# Patient Record
Sex: FEMALE | Race: BLACK OR AFRICAN AMERICAN | NOT HISPANIC OR LATINO | Employment: FULL TIME | URBAN - METROPOLITAN AREA
[De-identification: names, ages, dates, MRNs, and addresses within clinical notes are randomized per-mention and may not be internally consistent; named-entity substitution may affect disease eponyms.]

---

## 2021-11-27 ENCOUNTER — HOSPITAL ENCOUNTER (EMERGENCY)
Facility: HOSPITAL | Age: 21
Discharge: HOME | End: 2021-11-27
Attending: EMERGENCY MEDICINE

## 2021-11-27 VITALS
DIASTOLIC BLOOD PRESSURE: 70 MMHG | OXYGEN SATURATION: 100 % | SYSTOLIC BLOOD PRESSURE: 126 MMHG | BODY MASS INDEX: 25.76 KG/M2 | HEART RATE: 65 BPM | WEIGHT: 170 LBS | RESPIRATION RATE: 15 BRPM | TEMPERATURE: 98 F | HEIGHT: 68 IN

## 2021-11-27 DIAGNOSIS — T07.XXXA MULTIPLE ABRASIONS: ICD-10-CM

## 2021-11-27 DIAGNOSIS — M54.2 NECK PAIN: ICD-10-CM
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
## 2021-11-27 DIAGNOSIS — M79.10 MYALGIA: ICD-10-CM

## 2021-11-27 LAB
B-HCG UR QL: NEGATIVE
CTP QC/QA: YES

## 2021-11-27 PROCEDURE — 96372 THER/PROPH/DIAG INJ SC/IM: CPT | Mod: 59

## 2021-11-27 PROCEDURE — 81025 URINE PREGNANCY TEST: CPT | Performed by: NURSE PRACTITIONER

## 2021-11-27 PROCEDURE — 99284 EMERGENCY DEPT VISIT MOD MDM: CPT

## 2021-11-27 PROCEDURE — 63600175 PHARM REV CODE 636 W HCPCS: Performed by: NURSE PRACTITIONER

## 2021-11-27 RX ORDER — KETOROLAC TROMETHAMINE 30 MG/ML
30 INJECTION, SOLUTION INTRAMUSCULAR; INTRAVENOUS
Status: COMPLETED | OUTPATIENT
Start: 2021-11-27 | End: 2021-11-27

## 2021-11-27 RX ORDER — ORPHENADRINE CITRATE 30 MG/ML
60 INJECTION INTRAMUSCULAR; INTRAVENOUS
Status: COMPLETED | OUTPATIENT
Start: 2021-11-27 | End: 2021-11-27

## 2021-11-27 RX ORDER — LIDOCAINE 50 MG/G
1 PATCH TOPICAL DAILY
Qty: 15 PATCH | Refills: 0 | Status: SHIPPED | OUTPATIENT
Start: 2021-11-27

## 2021-11-27 RX ORDER — IBUPROFEN 600 MG/1
600 TABLET ORAL EVERY 6 HOURS PRN
Qty: 20 TABLET | Refills: 0 | Status: SHIPPED | OUTPATIENT
Start: 2021-11-27

## 2021-11-27 RX ORDER — METHOCARBAMOL 500 MG/1
1000 TABLET, FILM COATED ORAL 3 TIMES DAILY
Qty: 30 TABLET | Refills: 0 | Status: SHIPPED | OUTPATIENT
Start: 2021-11-27 | End: 2021-12-02

## 2021-11-27 RX ADMIN — KETOROLAC TROMETHAMINE 30 MG: 30 INJECTION, SOLUTION INTRAMUSCULAR at 06:11

## 2021-11-27 RX ADMIN — ORPHENADRINE CITRATE 60 MG: 60 INJECTION INTRAMUSCULAR; INTRAVENOUS at 06:11

## 2022-11-18 ENCOUNTER — HOSPITAL ENCOUNTER (EMERGENCY)
Facility: HOSPITAL | Age: 22
Discharge: HOME | End: 2022-11-19
Attending: STUDENT IN AN ORGANIZED HEALTH CARE EDUCATION/TRAINING PROGRAM
Payer: MEDICAID

## 2022-11-18 DIAGNOSIS — R52 PAIN: ICD-10-CM

## 2022-11-18 DIAGNOSIS — T23.172A: ICD-10-CM

## 2022-11-18 DIAGNOSIS — M25.532 LEFT WRIST PAIN: ICD-10-CM

## 2022-11-18 DIAGNOSIS — T23.102A: ICD-10-CM
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
## 2022-11-18 PROCEDURE — 99284 EMERGENCY DEPT VISIT MOD MDM: CPT

## 2022-11-18 PROCEDURE — 81025 URINE PREGNANCY TEST: CPT | Performed by: NURSE PRACTITIONER

## 2022-11-19 VITALS
WEIGHT: 165 LBS | RESPIRATION RATE: 18 BRPM | OXYGEN SATURATION: 100 % | BODY MASS INDEX: 25.01 KG/M2 | HEART RATE: 65 BPM | TEMPERATURE: 98 F | SYSTOLIC BLOOD PRESSURE: 123 MMHG | DIASTOLIC BLOOD PRESSURE: 77 MMHG | HEIGHT: 68 IN

## 2022-11-19 LAB
B-HCG UR QL: NEGATIVE
CTP QC/QA: YES

## 2022-11-19 PROCEDURE — 25000003 PHARM REV CODE 250: Performed by: STUDENT IN AN ORGANIZED HEALTH CARE EDUCATION/TRAINING PROGRAM

## 2022-11-19 RX ORDER — HYDROCODONE BITARTRATE AND ACETAMINOPHEN 5; 325 MG/1; MG/1
1 TABLET ORAL
Status: COMPLETED | OUTPATIENT
Start: 2022-11-19 | End: 2022-11-19

## 2022-11-19 RX ADMIN — HYDROCODONE BITARTRATE AND ACETAMINOPHEN 1 TABLET: 5; 325 TABLET ORAL at 01:11

## 2022-11-19 NOTE — DISCHARGE INSTRUCTIONS
Take tylenol 1000 mg and ibuprofen 600 mg for pain every 6 hours  Return if symptoms worsen.      Thank you for coming to our Emergency Department today. It is important to remember that some problems or medical conditions are difficult to diagnose and may not be found during your Emergency Department visit.     Be sure to follow up with your primary care doctor and review all labs/imaging/tests that were performed during your ER visit with them. Some labs/tests may be outside of the normal range and require non-emergent follow-up and further investigation to help diagnose/exclude/prevent complications or other potentially serious medical conditions that were not addressed during your ER visit.    If you do not have a primary care doctor, you may contact the one listed on your discharge paperwork or you may also call the [facility] Appointment Desk at [phone] to schedule an appointment and establish care with one. Another resources for finding primary care physicians: [email or web address] It is important to your health that you have a primary care doctor.    Please take all medications as directed. All medications may potentially have side-effects and it is impossible to predict which medications may give you side-effects or what side-effects (if any) they will give you. If you feel that you are having a negative effect or side-effect of any medication you should immediately stop taking them and seek medical attention. If you feel that you are having a life-threatening reaction call 911.    Return to the ER with any questions/concerns, new/concerning symptoms, worsening or failure to improve.     Do not drive, swim, climb to height, take a bath, operate heavy machinery, drink alcohol or take potentially sedating medications, sign any legal documents or make any important decisions for 24 hours if you have received any pain medications, sedatives or mood altering drugs during your ER visit or within 24  hours of taking them if they have been prescribed to you.     You can find additional resources for Dentists, hearing aids, durable medical equipment, low cost pharmacies and other resources at [email or web address]
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
## 2023-08-15 ENCOUNTER — HOSPITAL ENCOUNTER (EMERGENCY)
Facility: HOSPITAL | Age: 23
Discharge: HOME | End: 2023-08-15
Attending: EMERGENCY MEDICINE
Payer: MEDICAID

## 2023-08-15 VITALS
HEART RATE: 53 BPM | SYSTOLIC BLOOD PRESSURE: 112 MMHG | WEIGHT: 160 LBS | BODY MASS INDEX: 25.11 KG/M2 | OXYGEN SATURATION: 100 % | TEMPERATURE: 99 F | RESPIRATION RATE: 16 BRPM | DIASTOLIC BLOOD PRESSURE: 74 MMHG | HEIGHT: 67 IN

## 2023-08-15 DIAGNOSIS — R51.9 FRONTAL HEADACHE: Primary | ICD-10-CM

## 2023-08-15 LAB
B-HCG UR QL: NEGATIVE
CTP QC/QA: YES

## 2023-08-15 PROCEDURE — 25000003 PHARM REV CODE 250: Performed by: EMERGENCY MEDICINE

## 2023-08-15 PROCEDURE — 81025 URINE PREGNANCY TEST: CPT

## 2023-08-15 PROCEDURE — 99283 EMERGENCY DEPT VISIT LOW MDM: CPT

## 2023-08-15 RX ORDER — BUTALBITAL, ACETAMINOPHEN AND CAFFEINE 50; 325; 40 MG/1; MG/1; MG/1
1 TABLET ORAL EVERY 6 HOURS PRN
Qty: 40 TABLET | Refills: 0 | Status: SHIPPED | OUTPATIENT
Start: 2023-08-15 | End: 2023-08-25

## 2023-08-15 RX ORDER — KETOROLAC TROMETHAMINE 10 MG/1
10 TABLET, FILM COATED ORAL
Status: COMPLETED | OUTPATIENT
Start: 2023-08-15 | End: 2023-08-15

## 2023-08-15 RX ORDER — TETRACAINE HYDROCHLORIDE 5 MG/ML
2 SOLUTION OPHTHALMIC
Status: COMPLETED | OUTPATIENT
Start: 2023-08-15 | End: 2023-08-15

## 2023-08-15 RX ORDER — BUTALBITAL, ACETAMINOPHEN AND CAFFEINE 50; 325; 40 MG/1; MG/1; MG/1
1 TABLET ORAL
Status: COMPLETED | OUTPATIENT
Start: 2023-08-15 | End: 2023-08-15

## 2023-08-15 RX ADMIN — KETOROLAC TROMETHAMINE 10 MG: 10 TABLET, FILM COATED ORAL at 09:08

## 2023-08-15 RX ADMIN — BUTALBITAL, ACETAMINOPHEN AND CAFFEINE 1 TABLET: 325; 50; 40 TABLET ORAL at 10:08

## 2023-08-15 RX ADMIN — TETRACAINE HYDROCHLORIDE 2 DROP: 5 SOLUTION OPHTHALMIC at 09:08

## 2023-08-16 NOTE — ED PROVIDER NOTES
Encounter Date: 8/15/2023       History     Chief Complaint   Patient presents with    Headache     Headache x2 days, pt reports pain is behind left eye and left forehead     Emergent evaluation of a 23-year-old female who presents to the ER due to intermittent headaches for 4 days she reports that when headache is present his severe 10/10 lasting between 20 minutes to 1 hour.  Happening a proximally 4 times per day  In the left periorbital region most severely along the eyebrow and forehead she reports even exquisitely tender to palpate.  It is a throbbing pain with a sharp pain on palpation of the eyebrow.  She does not believe herself to have any nasal congestion rhinorrhea no sore throat ear pain.  She reports no neck pain or body aches no fevers chills or sweats.  She reports that when the headache is present it does hurt to move her eyes but once the headache resolves she is no pain and no residual symptoms she denies blurry vision double vision or loss of vision.  She does not get headaches regularly.   been no trauma.  No increased tearing of the eyes or redness.  She denies any eye pain.  No photophobia.  No associated numbness tingling or weakness in the extremities, no dizziness or lightheadedness.  No nausea or vomiting  She took Tylenol at 11:00 a.m. 1000 mg she reports headache did resolve but returned after proximally 2 hours      Review of patient's allergies indicates:  No Known Allergies  History reviewed. No pertinent past medical history.  History reviewed. No pertinent surgical history.  History reviewed. No pertinent family history.     Review of Systems   Constitutional:  Negative for activity change, appetite change, chills, diaphoresis, fatigue and fever.   HENT:  Negative for congestion, dental problem, drooling, ear discharge, ear pain, facial swelling, postnasal drip, rhinorrhea, sinus pressure, sinus pain, sore throat, trouble swallowing and voice change.    Eyes:  Positive for pain.  Negative for photophobia, discharge, redness, itching and visual disturbance.   Respiratory:  Negative for cough, chest tightness, shortness of breath, wheezing and stridor.    Cardiovascular:  Negative for chest pain and palpitations.   Gastrointestinal:  Negative for abdominal pain, nausea and vomiting.   Genitourinary:  Negative for dysuria, flank pain, frequency, hematuria and urgency.   Musculoskeletal:  Negative for arthralgias, back pain, myalgias, neck pain and neck stiffness.   Skin:  Negative for rash.   Neurological:  Positive for headaches. Negative for dizziness, syncope, weakness and light-headedness.   Hematological:  Does not bruise/bleed easily.   Psychiatric/Behavioral:  Negative for confusion. The patient is not nervous/anxious.    All other systems reviewed and are negative.      Physical Exam     Initial Vitals [08/15/23 2055]   BP Pulse Resp Temp SpO2   (!) 135/96 64 16 98 °F (36.7 °C) 99 %      MAP       --         Physical Exam    Nursing note and vitals reviewed.  Constitutional: She appears well-developed and well-nourished. She is not diaphoretic. No distress.   HENT:   Head: Normocephalic and atraumatic. Head is without raccoon's eyes, without Wilson's sign, without abrasion, without contusion, without laceration, without right periorbital erythema and without left periorbital erythema. Hair is normal.       Right Ear: Tympanic membrane, external ear and ear canal normal.   Left Ear: Tympanic membrane, external ear and ear canal normal.   Nose: No mucosal edema, rhinorrhea, nose lacerations, sinus tenderness, nasal deformity, septal deviation or nasal septal hematoma. No epistaxis.  No foreign bodies. Right sinus exhibits no maxillary sinus tenderness and no frontal sinus tenderness. Left sinus exhibits frontal sinus tenderness. Left sinus exhibits no maxillary sinus tenderness.   Mouth/Throat: Oropharynx is clear and moist. Normal dentition. No oropharyngeal exudate, posterior  oropharyngeal edema, posterior oropharyngeal erythema or tonsillar abscesses.   Eyes: Conjunctivae, EOM and lids are normal. Pupils are equal, round, and reactive to light. Right conjunctiva is not injected. Right conjunctiva has no hemorrhage. Left conjunctiva is not injected. Left conjunctiva has no hemorrhage. Right eye exhibits normal extraocular motion and no nystagmus. Left eye exhibits normal extraocular motion and no nystagmus.       Normal extraocular movements no pain with extraocular movements    Intra-ocular pressure on the left is 27  Intra-ocular pressure on the right is 25   Neck: Neck supple. No tracheal deviation present.   Normal range of motion.  Cardiovascular:  Normal rate, regular rhythm, normal heart sounds and intact distal pulses.     Exam reveals no gallop and no friction rub.       No murmur heard.  Pulmonary/Chest: Breath sounds normal. No stridor. No respiratory distress. She has no wheezes. She has no rhonchi. She has no rales. She exhibits no tenderness.   Musculoskeletal:         General: No edema. Normal range of motion.      Cervical back: Normal range of motion and neck supple.     Neurological: She is alert and oriented to person, place, and time. She has normal strength. No cranial nerve deficit or sensory deficit.   Skin: Skin is warm and dry. No rash noted. No erythema. No pallor.   Psychiatric: She has a normal mood and affect. Her behavior is normal. Judgment and thought content normal.         ED Course   Procedures  Labs Reviewed   POCT URINE PREGNANCY          Imaging Results    None          Medications   butalbital-acetaminophen-caffeine -40 mg per tablet 1 tablet (has no administration in time range)   ketorolac tablet 10 mg (10 mg Oral Given 8/15/23 2153)   TETRAcaine HCl (PF) 0.5 % Drop 2 drop (2 drops Both Eyes Given 8/15/23 2152)     Medical Decision Making:   Clinical Tests:   Lab Tests: Ordered and Reviewed  ED Management:  Emergent evaluation of a  23-year-old female who presents to the ER due to intermittent headaches for 4 days she reports that when headache is present his severe 10/10 lasting between 20 minutes to 1 hour.  Happening a proximally 4 times per day  In the left periorbital region most severely along the eyebrow and forehead she reports even exquisitely tender to palpate.  It is a throbbing pain with a sharp pain on palpation of the eyebrow.  She does not believe herself to have any nasal congestion rhinorrhea no sore throat ear pain.  She reports no neck pain or body aches no fevers chills or sweats.  She reports that when the headache is present it does hurt to move her eyes but once the headache resolves she is no pain and no residual symptoms she denies blurry vision double vision or loss of vision.  She does not get headaches regularly.   been no trauma.  No increased tearing of the eyes or redness.  She denies any eye pain.  No photophobia.  No associated numbness tingling or weakness in the extremities, no dizziness or lightheadedness.  No nausea or vomiting  She took Tylenol at 11:00 a.m. 1000 mg she reports headache did resolve but returned after proximally 2 hours  On physical exam patient is in no distress using her cell phone speaking with the nurse.  Blood pressure 135/96 other vitals are normal patient is afebrile full range motion of the neck without meningismus or nuchal rigidity.  Normal ocular exam mildly elevated  intra-ocular pressures.  No injection of the sclera no decreased range motion of the ocular muscles.  Tenderness to left eyebrow region and left frontal sinus.  No swelling redness or warmth.  No temporal artery tenderness.  Normal cardiac and lung exam normal focused neurologic exam  MDM    Patient presents for emergent evaluation of acute intermittent severe headaches for 4 days no headache currently that poses a threat to life and/or bodily function.   Differential diagnosis includes but was not limited to  meningitis, encephalitis, temporal arteritis, sinus headache, tension headache, glaucoma, migraines, cluster headaches, stroke, intracerebral hemorrhage, intra ocular injury, neoplasm.   In the ED patient found to have acute acute left frontal headache    I ordered labs and personally reviewed them.  Labs significant for UPT negative.      Discharge MDM     Patient was managed in the ED with oral Toradol 10 mg  The response to treatment was good.  She reports after the intra-ocular pressure exam she is developed in a headache in the left periorbital region I will give her a dose of Fioricet I have referred her to Neurology and Ophthalmology if symptoms not improve we have also discussed trying a antihistamine and Afrin to see if this helps her symptoms.   Patient was discharged in stable condition.  Detailed return precautions discussed.  Patient was told to follow up with primary care physician or specialist based on their diagnosis  Shaniqua Landrum MD                            Clinical Impression:   Final diagnoses:  [R51.9] Frontal headache (Primary)        ED Disposition Condition    Discharge Stable          ED Prescriptions       Medication Sig Dispense Start Date End Date Auth. Provider    butalbital-acetaminophen-caffeine -40 mg (FIORICET, ESGIC) -40 mg per tablet Take 1 tablet by mouth every 6 (six) hours as needed for Pain or Headaches. 40 tablet 8/15/2023 8/25/2023 Shaniqua Landrum MD          Follow-up Information       Follow up With Specialties Details Why Contact Info Additional Information    Alissa Saleem MD Neurology Schedule an appointment as soon as possible for a visit  If headaches continue or worsen [address] [facility] [address]  [phone]       [facility] - Emergency Dept Emergency Medicine Go to  If symptoms worsen [address] [facility] [address]  [phone] 1st floor    Chico Prabhakar MD Ophthalmology Schedule an  appointment as soon as possible for a visit  if symptoms continue- if you develop increased eye pain blurry vision double vision [address] [facility] [address]  [phone]                Shaniqua Landrum MD  08/15/23 6488

## 2023-08-16 NOTE — DISCHARGE INSTRUCTIONS
Try using Afrin nasal spray to see if this helps her symptoms as package directs and or you may also try Claritin Zyrtec or Allegra to see if this helps  headache symptoms.    Take Tylenol and or ibuprofen to control headaches if symptoms are not controlled you may take Fioricet as prescribed

## 2023-09-14 ENCOUNTER — OFFICE VISIT (OUTPATIENT)
Dept: OBSTETRICS AND GYNECOLOGY | Facility: CLINIC | Age: 23
End: 2023-09-14
Payer: MEDICAID

## 2023-09-14 ENCOUNTER — LAB VISIT (OUTPATIENT)
Dept: LAB | Facility: HOSPITAL | Age: 23
End: 2023-09-14
Attending: GENERAL PRACTICE
Payer: MEDICAID

## 2023-09-14 VITALS
RESPIRATION RATE: 16 BRPM | BODY MASS INDEX: 25 KG/M2 | SYSTOLIC BLOOD PRESSURE: 108 MMHG | DIASTOLIC BLOOD PRESSURE: 68 MMHG | WEIGHT: 159.31 LBS | HEIGHT: 67 IN

## 2023-09-14 DIAGNOSIS — Z01.419 WELL WOMAN EXAM: Primary | ICD-10-CM

## 2023-09-14 DIAGNOSIS — Z11.3 SCREEN FOR STD (SEXUALLY TRANSMITTED DISEASE): ICD-10-CM

## 2023-09-14 DIAGNOSIS — Z12.4 CERVICAL CANCER SCREENING: ICD-10-CM

## 2023-09-14 DIAGNOSIS — Z01.419 WELL WOMAN EXAM: ICD-10-CM

## 2023-09-14 LAB
HAV IGM SERPL QL IA: NORMAL
HBV CORE IGM SERPL QL IA: NORMAL
HBV SURFACE AG SERPL QL IA: NORMAL
HCV AB SERPL QL IA: NORMAL
HIV 1+2 AB+HIV1 P24 AG SERPL QL IA: NORMAL

## 2023-09-14 PROCEDURE — 81514 NFCT DS BV&VAGINITIS DNA ALG: CPT | Performed by: GENERAL PRACTICE

## 2023-09-14 PROCEDURE — 99385 PR PREVENTIVE VISIT,NEW,18-39: ICD-10-PCS | Mod: S$PBB,,, | Performed by: GENERAL PRACTICE

## 2023-09-14 PROCEDURE — 3008F PR BODY MASS INDEX (BMI) DOCUMENTED: ICD-10-PCS | Mod: CPTII,,, | Performed by: GENERAL PRACTICE

## 2023-09-14 PROCEDURE — 99385 PREV VISIT NEW AGE 18-39: CPT | Mod: S$PBB,,, | Performed by: GENERAL PRACTICE

## 2023-09-14 PROCEDURE — 36415 COLL VENOUS BLD VENIPUNCTURE: CPT | Mod: PO | Performed by: GENERAL PRACTICE

## 2023-09-14 PROCEDURE — 1159F MED LIST DOCD IN RCRD: CPT | Mod: CPTII,,, | Performed by: GENERAL PRACTICE

## 2023-09-14 PROCEDURE — 99999 PR PBB SHADOW E&M-EST. PATIENT-LVL III: CPT | Mod: PBBFAC,,, | Performed by: GENERAL PRACTICE

## 2023-09-14 PROCEDURE — 3074F PR MOST RECENT SYSTOLIC BLOOD PRESSURE < 130 MM HG: ICD-10-PCS | Mod: CPTII,,, | Performed by: GENERAL PRACTICE

## 2023-09-14 PROCEDURE — 86592 SYPHILIS TEST NON-TREP QUAL: CPT | Performed by: GENERAL PRACTICE

## 2023-09-14 PROCEDURE — 1159F PR MEDICATION LIST DOCUMENTED IN MEDICAL RECORD: ICD-10-PCS | Mod: CPTII,,, | Performed by: GENERAL PRACTICE

## 2023-09-14 PROCEDURE — 3008F BODY MASS INDEX DOCD: CPT | Mod: CPTII,,, | Performed by: GENERAL PRACTICE

## 2023-09-14 PROCEDURE — 99999 PR PBB SHADOW E&M-EST. PATIENT-LVL III: ICD-10-PCS | Mod: PBBFAC,,, | Performed by: GENERAL PRACTICE

## 2023-09-14 PROCEDURE — 87591 N.GONORRHOEAE DNA AMP PROB: CPT | Performed by: GENERAL PRACTICE

## 2023-09-14 PROCEDURE — 88175 CYTOPATH C/V AUTO FLUID REDO: CPT | Performed by: GENERAL PRACTICE

## 2023-09-14 PROCEDURE — 80074 ACUTE HEPATITIS PANEL: CPT | Performed by: GENERAL PRACTICE

## 2023-09-14 PROCEDURE — 3078F PR MOST RECENT DIASTOLIC BLOOD PRESSURE < 80 MM HG: ICD-10-PCS | Mod: CPTII,,, | Performed by: GENERAL PRACTICE

## 2023-09-14 PROCEDURE — 87389 HIV-1 AG W/HIV-1&-2 AB AG IA: CPT | Performed by: GENERAL PRACTICE

## 2023-09-14 PROCEDURE — 3078F DIAST BP <80 MM HG: CPT | Mod: CPTII,,, | Performed by: GENERAL PRACTICE

## 2023-09-14 PROCEDURE — 99213 OFFICE O/P EST LOW 20 MIN: CPT | Mod: PBBFAC,PO | Performed by: GENERAL PRACTICE

## 2023-09-14 PROCEDURE — 3074F SYST BP LT 130 MM HG: CPT | Mod: CPTII,,, | Performed by: GENERAL PRACTICE

## 2023-09-14 RX ORDER — ETONOGESTREL AND ETHINYL ESTRADIOL VAGINAL RING .015; .12 MG/D; MG/D
1 RING VAGINAL
Qty: 4 EACH | Refills: 3 | Status: SHIPPED | OUTPATIENT
Start: 2023-09-14 | End: 2024-09-13

## 2023-09-14 RX ORDER — CETIRIZINE HYDROCHLORIDE 10 MG/1
10 TABLET ORAL
COMMUNITY
Start: 2023-04-20

## 2023-09-14 RX ORDER — FLUTICASONE PROPIONATE 50 MCG
1 SPRAY, SUSPENSION (ML) NASAL 2 TIMES DAILY
COMMUNITY
Start: 2023-04-20

## 2023-09-14 NOTE — PROGRESS NOTES
HISTORY OF THE PRESENT ILLNESS    Blanca is a 23 y.o. here for a check-up and vaginal odor.  Would like to switch to NuvaRing from ROSLYN.    LMP: 24 AUG  Contraception: ROSLYN  G'sP's:     -------------------------------------------------------------------------------------------------------------    ALLERGIES  NKDA    PAST MEDICAL HISTORY  none    SOCIAL HISTORY  Relationship: single  Lives with: mom  Domestic Violence: no  Occupation:  [employer]  Education Level: High School, going to nursing school soon  Smoker: non-smoker  Alcohol: yes (wine couple times a month)  Drugs: yes (marijuana)    PAST SURGICAL HISTORY  Tonsils     MEDICATIONS  ROSLYN  Allergy meds prn    OBSTETRIC HISTORY  ETP: Yes, surgical ()    GYNECOLOGIC HISTORY  MENSES:  Menarche: 15 yoa    Bleeding: N/A   Period duration: 5 days   # Heavy Days: 3   Pad/tampon ? on heavy days: 6x a day   Intermenstrual bleeding: No   Period frequency: regular every 28-30 days   Other: -     DYSMENORRHEA: Yes, takes midol and pamprin  PELVIC PRESSURE OR PAIN: No  DYSPAREUNIA: No    PAP'S: no h/o abnormals  STI'S: recent diagnosis: chlamydia SEP 2022  GENITAL HSV: no    --------------------------------------------------------------------------------------------------------------    PHYSICAL EXAM  VITALS:  Vitals:    23 1040   BP: 108/68   Resp: 16       GEN = alert/oriented, nad, cheerful and pleasant  HEENT = sclera anicteric, EOM grossly normal   =      External: nefg, no lesions     Vagina: normal and without lesions     Discharge: moderate and creamy     Cervix: normal-appearing, PAP smear obtained, and CT/NG swab obtained     Bimanual: uterus normal size and nontender, no adnexal masses or tenderness      ASSESSMENT AND PLAN:  22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  Requesting STD screening and evaluation for vaginal odor.  Requesting to switch from ROSLYN to NuvaRing.    -f/u results of PAP --> if negative then next screen in 3 yrs  -f/u results of vaginitis swab  -f/u results of  CT/NG and other STD testing  -Gardasil complete  -labs: HIV, HEP, RPR  -Rx: NuvaRing  -RTC for periodic GYN exam, sooner prn  -Encouraged patient to minimize the number of lifetime partners she has.  Advised just one sexual encounter can result in STD or pregnancy.  Discussed potential immediate and long-term consequences of STD's: discomfort / pain, social stigma, chronic illness, infertility, emotional effects, HPV related cancers.  Discussed life-changing impact of pregnancy, whether it results in SAB, ETP, or live birth.  Stressed that no contraceptive method is 100% effective.  Stressed that condoms are the only way to prevent STD's.    MD RONALD

## 2023-09-14 NOTE — PATIENT INSTRUCTIONS
Have a question or concern?  for an emergency  call 911 or go to the nearest hospital [facility] Nurse Care Advice Line  [phone]  you can speak to a nurse 24-7   for non-urgent issues, send us a  message in [facility] for non-urgent issues, call the clinic  [phone], Option 3   Consider calling the Nurse Care Advice Line if it's a weekend or toward the end of the work-day since [employer] and phone messages may not be answered right away.     PAP SMEARS:  Screening for cervical cancer involves 1 or 2 parts based on your age and situation:  PAP smear (looking at the cells from your cervix)  HPV testing (checking whether you have the Human Papilloma Virus in your cervical cells)    These test results often come back at different times, but you won't hear from me until they BOTH are back since both pieces of information are important in deciding what to do next.    Soif you see a PAP result in [facility] (or an HPV result) that is abnormal, please allow another 7-10 business days before you send a message asking what to do next.  I am waiting for the other test result before I make a recommendation.    If both tests are resulted in [facility], you should hear from me within 2-3 business days.    Abnormal tests will be followed up in one of two ways:  Repeat testing of PAP and/or HPV  Colposcopy (examination and biopsy of your cervix in the office using staining solutions and magnification)    STD AND VAGINITIS TESTING:  If you are enrolled in [facility], I will trust that when you see a negative result, you understand that means you do not have the particular STD we were checking for.  You will not get a message from me.    If something comes back positive, one of my staff members or I will call you to let you know about the result and what the recommended treatment is.  For most STD's, it is VERY important that you do not have sex until both you and your partner(s) have completed treatment for the STD.  I cannot  prescribe medications for your partner(s).    Bacterial Vaginitis (BV) and vaginal yeast infections (Candida, for example) are not STD's.    Trichomonas is an STD.    If you are prescribed an antibiotic, it is very important that you take all of the medicine as prescribed.  Incomplete treatment can cause a relapse and/or antibiotic resistance.

## 2023-09-15 LAB
BACTERIAL VAGINOSIS DNA: POSITIVE
C TRACH DNA SPEC QL NAA+PROBE: NOT DETECTED
CANDIDA GLABRATA DNA: NEGATIVE
CANDIDA KRUSEI DNA: NEGATIVE
CANDIDA RRNA VAG QL PROBE: NEGATIVE
N GONORRHOEA DNA SPEC QL NAA+PROBE: NOT DETECTED
RPR SER QL: NORMAL
T VAGINALIS RRNA GENITAL QL PROBE: NEGATIVE

## 2023-09-18 ENCOUNTER — TELEPHONE (OUTPATIENT)
Dept: OBSTETRICS AND GYNECOLOGY | Facility: CLINIC | Age: 23
End: 2023-09-18
Payer: MEDICAID

## 2023-09-18 DIAGNOSIS — B96.89 BACTERIAL VAGINITIS: Primary | ICD-10-CM

## 2023-09-18 DIAGNOSIS — N76.0 BACTERIAL VAGINITIS: Primary | ICD-10-CM

## 2023-09-18 RX ORDER — METRONIDAZOLE 500 MG/1
500 TABLET ORAL EVERY 12 HOURS
Qty: 14 TABLET | Refills: 0 | Status: SHIPPED | OUTPATIENT
Start: 2023-09-18 | End: 2023-09-25

## 2023-09-18 NOTE — TELEPHONE ENCOUNTER
----- Message from Raven Li MD sent at 9/18/2023  8:46 AM CDT -----  Team, please call Blanca to be sure she gets this message.    -------------------------    Blanca,    Your test shows you have a vaginal infection called Bacterial Vaginosis (BV).  I have ordered a medication called metronidazole (or Flagyl) for you to take by mouth twice a day for 7 days.  BV is not an STD.    Sincerely,  Dr. Li

## 2023-09-18 NOTE — TELEPHONE ENCOUNTER
Contacted pt about her BV and that Dr. Li sent over her medication to her pharmacy. Pt verbalized understanding.

## 2023-09-19 LAB
FINAL PATHOLOGIC DIAGNOSIS: NORMAL
Lab: NORMAL